# Patient Record
Sex: FEMALE | Race: WHITE
[De-identification: names, ages, dates, MRNs, and addresses within clinical notes are randomized per-mention and may not be internally consistent; named-entity substitution may affect disease eponyms.]

---

## 2019-06-14 ENCOUNTER — HOSPITAL ENCOUNTER (INPATIENT)
Dept: HOSPITAL 74 - YASAS | Age: 34
LOS: 5 days | Discharge: HOME | DRG: 773 | End: 2019-06-19
Attending: SURGERY | Admitting: SURGERY
Payer: COMMERCIAL

## 2019-06-14 VITALS — BODY MASS INDEX: 19.1 KG/M2

## 2019-06-14 DIAGNOSIS — M54.5: ICD-10-CM

## 2019-06-14 DIAGNOSIS — M51.26: ICD-10-CM

## 2019-06-14 DIAGNOSIS — R63.4: ICD-10-CM

## 2019-06-14 DIAGNOSIS — G47.00: ICD-10-CM

## 2019-06-14 DIAGNOSIS — F13.230: ICD-10-CM

## 2019-06-14 DIAGNOSIS — F17.210: ICD-10-CM

## 2019-06-14 DIAGNOSIS — F19.24: ICD-10-CM

## 2019-06-14 DIAGNOSIS — F32.9: ICD-10-CM

## 2019-06-14 DIAGNOSIS — F19.282: ICD-10-CM

## 2019-06-14 DIAGNOSIS — F12.10: ICD-10-CM

## 2019-06-14 DIAGNOSIS — Z85.841: ICD-10-CM

## 2019-06-14 DIAGNOSIS — E86.0: ICD-10-CM

## 2019-06-14 DIAGNOSIS — F41.9: ICD-10-CM

## 2019-06-14 DIAGNOSIS — G89.29: ICD-10-CM

## 2019-06-14 DIAGNOSIS — F14.10: ICD-10-CM

## 2019-06-14 DIAGNOSIS — F11.23: Primary | ICD-10-CM

## 2019-06-14 DIAGNOSIS — Z88.8: ICD-10-CM

## 2019-06-14 PROCEDURE — HZ2ZZZZ DETOXIFICATION SERVICES FOR SUBSTANCE ABUSE TREATMENT: ICD-10-PCS | Performed by: SURGERY

## 2019-06-14 RX ADMIN — NICOTINE SCH MG: 21 PATCH TRANSDERMAL at 11:20

## 2019-06-14 RX ADMIN — METHOCARBAMOL PRN MG: 500 TABLET ORAL at 19:46

## 2019-06-14 RX ADMIN — NICOTINE POLACRILEX PRN MG: 2 GUM, CHEWING ORAL at 20:27

## 2019-06-14 RX ADMIN — Medication PRN MG: at 23:11

## 2019-06-14 RX ADMIN — Medication SCH MG: at 22:13

## 2019-06-14 NOTE — HP
COWS





- Scale


Resting Pulse: 1= NE 


Sweatin= Chills/Flushing


Restless Observation: 1= Difficult to Sit Still


Pupil Size: 1= Pupils >than Normal


Bone or Joint Aches: 2= Severe Diffuse Aches


Runny Nose/ Eye Tearin= Runny Nose/Eyes


GI Upset > 30mins: 2= Nausea/Diarrhea


Tremor Observation: 2= Slight Tremor Visible


Yawning Observation: 2= >3x During Session


Anxiety or Irritability: 2=Irritable/Anxious


Goose Flesh Skin: 0=Smooth Skin


COWS Score: 16





CIWA Score


Nausea/Vomitin


Muscle Tremors: 2


Anxiety: 2


Agitation: 2


Paroxysmal Sweats: 1-Minimal Palms Moist


Orientation: 1-Uncertain about Date


Tacttile Disturbances: 1-Very Mild Itch/Numbness


Auditory Disturbances: 1-Very Mild


Visual Disturbances: 0-None


Headache: 2-Mild


CIWA-Ar Total Score: 14





- Admission Criteria


[facility] Guidelines: Admission for Medically Managed Detox: 


Requires at least one of the followin. CIWA greater than 12


2. Seizures within the past 24 hours


3. Delirium tremens within the past 24 hours


4. Hallucinations within the past 24 hours


5. Acute intervention needed for co  occurring medical disorder


6. Acute intervention needed for co  occurring psychiatric disorder


7. Severe withdrawal that cannot be handled at a lower level of care (continued


    vomiting, continued diarrhea, abnormal vital signs) requiring intravenous


    medication and/or fluids


8. Pregnancy








Admission ROS S





- [facility]


Chief Complaint: 





i need help to stop using heroin,xanax,also cocaine and marijuana


Allergies/Adverse Reactions: 


 Allergies











Allergy/AdvReac Type Severity Reaction Status Date / Time


 


carbamazepine [From Tegretol] Allergy Mild Rash Verified 19 08:55


 


sertraline [From Zoloft] Allergy Mild Rash Verified 19 08:54











History of Present Illness: 





this 33 years old female with heroin and xanax dependence and cocaine and 

marijuana abused,seeking detox,withdrawal symptom,


last detox [facility] in 2015


nicotine dependence 1 pack/day,would like to have nicotine patch and um


weight loss 


anxiety,depression,insomnia


longest sobriety 2 years


possible rehab after detox


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


low back pain herniated disc


Exam Limitations: No Limitations





- Ebola screening


Have you traveled outside of the country in the last 21 days: No (N)


Have you had contact with anyone from an Ebola affected area: No


Do you have a fever: No





- Review of Systems


Constitutional: Chills, Loss of Appetite, Malaise, Night Sweats, Changes in 

sleep, Weakness, Unintentional Wgt. Loss


EENT: reports: Tearing, Nose Congestion, Other (history of benign brain tumor 

at age of 10 years old)


Respiratory: reports: No Symptoms reported


Cardiac: reports: No Symptoms Reported


GI: reports: Diarrhea, Nausea, Poor Appetite, Abdominal cramping


: reports: No Symptoms Reported


Musculoskeletal: reports: Back Pain, Joint Pain, Muscle Pain, Joint Stiffness


Integumentary: reports: Dryness


Neuro: reports: Headache, Tremors


Endocrine: reports: No Symptoms Reported


Hematology: reports: No Symptoms Reported


Psychiatric: reports: No Sypmtoms Reported, Judgement Intact, Mood/Affect 

Appropiate, Orientated x3, Anxious, Depressed, other (insomnia)


Other Systems: Reviewed and Negative





Patient History





- Patient Medical History


Hx Anemia: No


Hx Asthma: No


Hx Chronic Obstructive Pulmonary Disease (COPD): No


Hx Cancer: No


Hx Cardiac Disorders: No


Hx Congestive Heart Failure: No


Hx Hypertension: No


Hx Hypercholesterolemia: No


Hx Pacemaker: No


HX Cerebrovascular Accident: No


Hx Seizures: No


Hx Dementia: No


Hx Diabetes: No


Hx Gastrointestinal Disorders: No


Hx Liver Disease: No


Hx Genitourinary Disorders: No


Hx Sexually Transmitted Disorders: No


Hx Renal Disease (ESRD): No


Hx Thyroid Disease: No


Hx Human Immunodeficiency Virus (HIV): No (last  negative)


Hx Hepatitis C: No


Hx Depression: Yes


Hx Suicide Attempt: No


Hx Bipolar Disorder: No


Hx Schizophrenia: No


Other Medical History: no suicidal,no homicidal,history of benign brain tumor 

at age of 10 years ol





- Patient Surgical History


Past Surgical History: Yes


Hx Neurologic Surgery: Yes (removal of benign brain tumor at age 10 years)


Hx  Section: Yes (x )





- PPD History


Previous Implant?: Yes


Documented Results: Negative w/o proof


Implanted On Prior [facility] Admission?: No


PPD to be Administered?: Yes





- Reproductive History


Patient is a Female of Child Bearing Age (11 -55 yrs old): Yes


Last Menstrual Period: 19


Patient Pregnant: No





- Smoking Cessation


Smoking history: Current every day smoker


Have you smoked in the past 12 months: Yes


Cigars Per Day: 20


Hx Chewing Tobacco Use: No


Initiated information on smoking cessation: Yes


'Breaking Loose' booklet given: 19





- Substance & Tx. History


Hx Alcohol Use: No


Hx Substance Use: Yes


Substance Use Type: Cocaine, Heroin, Marijuana, Tranquilizers


Hx Substance Use Treatment: Yes ([facility] )





- Substances abused


  ** Heroin


Substance route: Inhalation


Frequency: Daily


Amount used: 14 bags a day


Age of first use: 24


Date of last use: 19





  ** Benzodiazepine (Klonopin)


Substance route: Oral


Frequency: Daily


Amount used: 12 pills of 2 mgs


Age of first use: 24


Date of last use: 19





  ** Cocaine


Substance route: Smoking


Frequency: 1-3 times last 30 days


Amount used: 50$


Age of first use: 28


Date of last use: 19





  ** Marijuana/Hashish


Substance route: Smoking


Frequency: 1-3 times last 30 days


Amount used: 20$


Age of first use: 15


Date of last use: 19





Family Disease History





- Family Disease History


Family History: Denies





Admission Physical Exam B[facility]





- Vital Signs


Vital Signs: 


 Vital Signs - 24 hr











  19





  08:57


 


Temperature 99.3 F


 


Pulse Rate 84


 


Respiratory 16





Rate 


 


Blood Pressure 105/74














- Physical


General Appearance: Yes: Moderate Distress, Irritable, Anxious


HEENTM: Yes: Normal ENT Inspection, BILL, Pharynx Normal, Other (history of 

removal of benign brain tumor at age of 10 years old scar in occipital area)


Respiratory: Yes: Within Normal Limits, Lungs Clear, Normal Breath Sounds, No 

Respiratory Distress


Neck: Yes: Supple, Trachea in good position, Thyroid tenderness


Breast: Yes: Breast Exam Deferred


Cardiology: Yes: Within Normal Limits, Regular Rhythm, Regular Rate, S1, S2


Abdominal: Yes: Within Normal Limits, Normal Bowel Sounds, Non Tender, Flat, 

Soft


Genitourinary: Yes: Within Normal Limits


Back: Yes: Normal Inspection, Muscle Spasm


Musculoskeletal: Yes: full range of Motion, Back pain, Muscle Pain


Extremities: Yes: Normal Range of Motion, Tremors


Neurological: Yes: CNs II-XII NML intact, Alert, Motor Strength 5/5


Integumentary: Yes: Dry


Lymphatic: Yes: Within Normal Limits





- Diagnostic


(1) Opioid dependence with withdrawal


Current Visit: Yes   Status: Acute   





(2) Poisoning by unspecified sedative or hypnotic


Current Visit: Yes   Status: Acute   





(3) Uncomplicated sedative, hypnotic or anxiolytic withdrawal


Current Visit: Yes   Status: Acute   





(4) Low back pain


Current Visit: Yes   Status: Acute   





(5) Herniated lumbar intervertebral disc


Current Visit: Yes   Status: Acute   





(6) History of brain tumor


Current Visit: Yes   Status: Acute   





(7) Weight loss


Current Visit: Yes   Status: Acute   





(8) Anxiety and depression


Current Visit: Yes   Status: Acute   





(9) Insomnia


Current Visit: Yes   Status: Acute   





(10) Dehydration


Current Visit: Yes   Status: Acute   





Cleared for Admission S





- Detox or Rehab


[facility] Level of Care: Medically Managed


Detox Regimen/Protocol: Methadone/Valium





Inpatient Rehab Admission





- Rehab Decision to Admit


Inpatient rehab admission?: No

## 2019-06-15 LAB
ALBUMIN SERPL-MCNC: 4 G/DL (ref 3.4–5)
ALP SERPL-CCNC: 40 U/L (ref 45–117)
ALT SERPL-CCNC: 24 U/L (ref 13–61)
ANION GAP SERPL CALC-SCNC: 8 MMOL/L (ref 8–16)
AST SERPL-CCNC: 13 U/L (ref 15–37)
BILIRUB SERPL-MCNC: 0.4 MG/DL (ref 0.2–1)
BUN SERPL-MCNC: 10.3 MG/DL (ref 7–18)
CALCIUM SERPL-MCNC: 8.8 MG/DL (ref 8.5–10.1)
CHLORIDE SERPL-SCNC: 105 MMOL/L (ref 98–107)
CO2 SERPL-SCNC: 24 MMOL/L (ref 21–32)
CREAT SERPL-MCNC: 0.9 MG/DL (ref 0.55–1.3)
DEPRECATED RDW RBC AUTO: 12.3 % (ref 11.6–15.6)
GLUCOSE SERPL-MCNC: 119 MG/DL (ref 74–106)
HCT VFR BLD CALC: 39 % (ref 32.4–45.2)
HGB BLD-MCNC: 13.2 GM/DL (ref 10.7–15.3)
MCH RBC QN AUTO: 32.4 PG (ref 25.7–33.7)
MCHC RBC AUTO-ENTMCNC: 33.9 G/DL (ref 32–36)
MCV RBC: 95.6 FL (ref 80–96)
PLATELET # BLD AUTO: 233 K/MM3 (ref 134–434)
PMV BLD: 9 FL (ref 7.5–11.1)
POTASSIUM SERPLBLD-SCNC: 3.6 MMOL/L (ref 3.5–5.1)
PROT SERPL-MCNC: 7 G/DL (ref 6.4–8.2)
RBC # BLD AUTO: 4.09 M/MM3 (ref 3.6–5.2)
SODIUM SERPL-SCNC: 138 MMOL/L (ref 136–145)
WBC # BLD AUTO: 5.4 K/MM3 (ref 4–10)

## 2019-06-15 RX ADMIN — NICOTINE POLACRILEX PRN MG: 2 GUM, CHEWING ORAL at 14:09

## 2019-06-15 RX ADMIN — Medication PRN MG: at 22:01

## 2019-06-15 RX ADMIN — NICOTINE SCH MG: 21 PATCH TRANSDERMAL at 10:33

## 2019-06-15 RX ADMIN — Medication SCH TAB: at 10:33

## 2019-06-15 RX ADMIN — METHOCARBAMOL PRN MG: 500 TABLET ORAL at 11:49

## 2019-06-15 RX ADMIN — NICOTINE POLACRILEX PRN MG: 2 GUM, CHEWING ORAL at 17:50

## 2019-06-15 RX ADMIN — NICOTINE POLACRILEX PRN MG: 2 GUM, CHEWING ORAL at 22:04

## 2019-06-15 RX ADMIN — Medication SCH MG: at 22:01

## 2019-06-15 RX ADMIN — HYDROXYZINE PAMOATE PRN MG: 25 CAPSULE ORAL at 22:03

## 2019-06-15 NOTE — PN
S CIWA





- CIWA Score


Nausea/Vomitin-No Nausea/No Vomiting


Muscle Tremors: 2


Anxiety: 2


Agitation: 2


Paroxysmal Sweats: 2


Orientation: 0-Oriented


Tacttile Disturbances: 0-None


Auditory Disturbances: 0-None


Visual Disturbances: 0-None


Headache: 2-Mild


CIWA-Ar Total Score: 10





BHS COWS





- Scale


Resting Pulse: 0= [place] 80 or Below


Sweating: 3= Beads of Sweat on Face


Restless Observation: 1= Difficult to Sit Still


Pupil Size: 0= Normal to Room Light


Bone or Joint Aches: 4=Acute Joint/Muscle Pain


Runny Nose/ Eye Tearin= None


GI Upset > 30mins: 0= None


Tremor Observation of Outstretched Hands: 2= Slight Tremor Visible


Yawning Observation: 1= 1-2x During Session


Anxiety or Irritability: 2=Irritable/Anxious


Goose Flesh Skin: 0=Smooth Skin


COWS Score: 13





BHS Progress Note (SOAP)


Subjective: 





c/o headache, irritability/anxiety, sweats, muscle aches, and shakes.


Objective: 





06/15/19 12:47


 Vital Signs











  06/15/19 06/15/19





  06:26 09:42


 


Temperature 97.0 F L 97.5 F L


 


Pulse Rate 74 85


 


Respiratory 18 18





Rate  


 


Blood Pressure 98/56 L 104/66








 Lab Results











WBC  5.4 K/mm3 (4.0-10.0)   06/15/19  07:40    


 


RBC  4.09 M/mm3 (3.60-5.2)   06/15/19  07:40    


 


Hgb  13.2 GM/dL (10.7-15.3)   06/15/19  07:40    


 


Hct  39.0 % (32.4-45.2)   06/15/19  07:40    


 


MCV  95.6 fl (80-96)   06/15/19  07:40    


 


MCHC  33.9 g/dl (32.0-36.0)   06/15/19  07:40    


 


RDW  12.3 % (11.6-15.6)   06/15/19  07:40    


 


Plt Count  233 K/MM3 (134-434)   06/15/19  07:40    


 


Sodium  138 mmol/L (136-145)   06/15/19  07:40    


 


Potassium  3.6 mmol/L (3.5-5.1)   06/15/19  07:40    


 


Chloride  105 mmol/L ()   06/15/19  07:40    


 


Carbon Dioxide  24 mmol/L (21-32)   06/15/19  07:40    


 


Anion Gap  8 MMOL/L (8-16)   06/15/19  07:40    


 


BUN  10.3 mg/dL (7-18)   06/15/19  07:40    


 


Creatinine  0.9 mg/dL (0.55-1.3)   06/15/19  07:40    


 


Random Glucose  119 mg/dL ()  H  06/15/19  07:40    


 


Calcium  8.8 mg/dL (8.5-10.1)   06/15/19  07:40    








Labs noted.


Assessment: 





06/15/19 12:48


AOX3, in no acute distress


Full ROM, ambulating in the unit.


Withdrawal symptoms.


Plan: 





continue detox


increase fluids.

## 2019-06-15 NOTE — EKG
Test Reason : 

Blood Pressure : ***/*** mmHG

Vent. Rate : 074 BPM     Atrial Rate : 074 BPM

   P-R Int : 168 ms          QRS Dur : 088 ms

    QT Int : 380 ms       P-R-T Axes : 075 064 058 degrees

   QTc Int : 421 ms

 

NORMAL SINUS RHYTHM WITH SINUS ARRHYTHMIA

NORMAL ECG

NO PREVIOUS ECGS AVAILABLE

Confirmed by MD GUS, MARCK ([id number]) on 6/15/2019 12:25:48 PM

 

Referred By:             Confirmed By:MARCK WEBER MD

## 2019-06-16 LAB
APPEARANCE UR: CLEAR
BILIRUB UR STRIP.AUTO-MCNC: NEGATIVE MG/DL
COLOR UR: YELLOW
KETONES UR QL STRIP: NEGATIVE
LEUKOCYTE ESTERASE UR QL STRIP.AUTO: NEGATIVE
NITRITE UR QL STRIP: NEGATIVE
PH UR: 5 [PH] (ref 5–8)
PROT UR QL STRIP: NEGATIVE
PROT UR QL STRIP: NEGATIVE
SP GR UR: 1.01 (ref 1.01–1.03)
UROBILINOGEN UR STRIP-MCNC: 0.2 MG/DL (ref 0.2–1)

## 2019-06-16 RX ADMIN — IBUPROFEN PRN MG: 400 TABLET, FILM COATED ORAL at 22:36

## 2019-06-16 RX ADMIN — Medication SCH: at 23:31

## 2019-06-16 RX ADMIN — HYDROXYZINE PAMOATE PRN MG: 25 CAPSULE ORAL at 17:48

## 2019-06-16 RX ADMIN — Medication SCH MG: at 22:37

## 2019-06-16 RX ADMIN — METHOCARBAMOL PRN MG: 500 TABLET ORAL at 12:53

## 2019-06-16 RX ADMIN — NICOTINE POLACRILEX PRN MG: 2 GUM, CHEWING ORAL at 22:39

## 2019-06-16 RX ADMIN — Medication PRN MG: at 22:36

## 2019-06-16 RX ADMIN — METHOCARBAMOL PRN MG: 500 TABLET ORAL at 05:18

## 2019-06-16 RX ADMIN — NICOTINE POLACRILEX PRN MG: 2 GUM, CHEWING ORAL at 11:01

## 2019-06-16 RX ADMIN — Medication SCH TAB: at 10:28

## 2019-06-16 RX ADMIN — METHOCARBAMOL PRN MG: 500 TABLET ORAL at 22:37

## 2019-06-16 RX ADMIN — NICOTINE SCH MG: 21 PATCH TRANSDERMAL at 10:28

## 2019-06-16 RX ADMIN — LIDOCAINE SCH PATCH: 50 PATCH TOPICAL at 15:22
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
## 2019-06-16 NOTE — PN
S CIWA





- CIWA Score


Nausea/Vomitin-Mild Nausea/No Vomiting


Muscle Tremors: 2


Anxiety: 3


Agitation: 1-Slight > Activity


Paroxysmal Sweats: No Perspiration


Orientation: 0-Oriented


Tacttile Disturbances: 0-None


Auditory Disturbances: 0-None


Visual Disturbances: 0-None


Headache: 2-Mild


CIWA-Ar Total Score: 9





BHS COWS





- Scale


Resting Pulse: 0= RI 80 or Below


Sweatin= Chills/Flushing


Restless Observation: 1= Difficult to Sit Still


Pupil Size: 1= Pupils >than Normal


Bone or Joint Aches: 1= Mild Discomfort


Runny Nose/ Eye Tearin= Nasal Congestion


GI Upset > 30mins: 2= Nausea/Diarrhea


Tremor Observation of Outstretched Hands: 0= None


Yawning Observation: 0= None


Anxiety or Irritability: 1=Feels Anxious/Irritable


Goose Flesh Skin: 0=Smooth Skin


COWS Score: 8





BHS Progress Note (SOAP)


Subjective: 


ACHINESS, POOR SLEEP FIDGETY AND ANXIOUS


Objective: 





19 13:55


 Vital Signs - 24 hr











  06/15/19 06/15/19 06/15/19





  14:01 18:07 21:52


 


Temperature 96 F L 96.5 F L 97.1 F L


 


Pulse Rate 65 80 78


 


Respiratory 18 18 16





Rate   


 


Blood Pressure 104/69 109/65 99/66














  19





  00:30 03:30 06:48


 


Temperature   98.1 F


 


Pulse Rate   61


 


Respiratory 18 18 18





Rate   


 


Blood Pressure   99/62














  19





  09:10 13:15


 


Temperature 96.1 F L 97.3 F L


 


Pulse Rate 64 72


 


Respiratory 16 18





Rate  


 


Blood Pressure 92/68 96/66








 Laboratory Tests











  06/15/19 06/15/19 06/15/19





  07:40 07:40 07:40


 


WBC  5.4  


 


RBC  4.09  


 


Hgb  13.2  


 


Hct  39.0  


 


MCV  95.6  


 


MCH  32.4  


 


MCHC  33.9  


 


RDW  12.3  


 


Plt Count  233  


 


MPV  9.0  


 


Sodium   138 


 


Potassium   3.6 


 


Chloride   105 


 


Carbon Dioxide   24 


 


Anion Gap   8 


 


BUN   10.3 


 


Creatinine   0.9 


 


Est GFR (CKD-EPI)AfAm   98.06 


 


Est GFR (CKD-EPI)NonAf   84.60 


 


Random Glucose   119 H 


 


Calcium   8.8 


 


Total Bilirubin   0.4 


 


AST   13 L 


 


ALT   24 


 


Alkaline Phosphatase   40 L 


 


Total Protein   7.0 


 


Albumin   4.0 


 


RPR Titer    Nonreactive








AMBULATORY, ORIENTED, ALERT


Assessment: 





19 13:56


OPIATE DEP


Plan: 


ADD LIDODERM FOR BACK PAIN


CONT CURRENT PROTOCOL

## 2019-06-17 RX ADMIN — NICOTINE POLACRILEX PRN MG: 2 GUM, CHEWING ORAL at 15:00

## 2019-06-17 RX ADMIN — SUVOREXANT PRN MG: 10 TABLET, FILM COATED ORAL at 22:19

## 2019-06-17 RX ADMIN — METHOCARBAMOL PRN MG: 500 TABLET ORAL at 15:00

## 2019-06-17 RX ADMIN — NICOTINE SCH MG: 21 PATCH TRANSDERMAL at 10:21

## 2019-06-17 RX ADMIN — Medication SCH TAB: at 10:20

## 2019-06-17 RX ADMIN — METHOCARBAMOL PRN MG: 500 TABLET ORAL at 22:19

## 2019-06-17 RX ADMIN — NICOTINE POLACRILEX PRN MG: 2 GUM, CHEWING ORAL at 11:03

## 2019-06-17 RX ADMIN — Medication SCH: at 22:16

## 2019-06-17 RX ADMIN — Medication SCH APPLIC: at 22:15

## 2019-06-17 RX ADMIN — Medication SCH MG: at 22:15

## 2019-06-17 RX ADMIN — HYDROXYZINE PAMOATE PRN MG: 25 CAPSULE ORAL at 15:00

## 2019-06-17 RX ADMIN — Medication SCH APPLIC: at 15:01

## 2019-06-17 RX ADMIN — HYDROXYZINE PAMOATE PRN MG: 25 CAPSULE ORAL at 00:36

## 2019-06-17 RX ADMIN — NICOTINE POLACRILEX PRN MG: 2 GUM, CHEWING ORAL at 22:17

## 2019-06-17 RX ADMIN — LIDOCAINE SCH PATCH: 50 PATCH TOPICAL at 10:21

## 2019-06-18 RX ADMIN — Medication SCH: at 22:04

## 2019-06-18 RX ADMIN — Medication SCH TAB: at 10:30

## 2019-06-18 RX ADMIN — HYDROXYZINE PAMOATE PRN MG: 25 CAPSULE ORAL at 14:36

## 2019-06-18 RX ADMIN — IBUPROFEN PRN MG: 400 TABLET, FILM COATED ORAL at 22:07

## 2019-06-18 RX ADMIN — Medication SCH MG: at 22:04

## 2019-06-18 RX ADMIN — METHOCARBAMOL PRN MG: 500 TABLET ORAL at 23:29

## 2019-06-18 RX ADMIN — METHOCARBAMOL PRN MG: 500 TABLET ORAL at 17:01

## 2019-06-18 RX ADMIN — METHOCARBAMOL PRN MG: 500 TABLET ORAL at 10:30

## 2019-06-18 RX ADMIN — SUVOREXANT PRN MG: 10 TABLET, FILM COATED ORAL at 22:07

## 2019-06-18 RX ADMIN — LIDOCAINE SCH PATCH: 50 PATCH TOPICAL at 10:30

## 2019-06-18 RX ADMIN — NICOTINE POLACRILEX PRN MG: 2 GUM, CHEWING ORAL at 14:37

## 2019-06-18 RX ADMIN — Medication SCH APPLIC: at 10:30

## 2019-06-18 RX ADMIN — NICOTINE SCH MG: 21 PATCH TRANSDERMAL at 10:30

## 2019-06-18 RX ADMIN — NICOTINE POLACRILEX PRN MG: 2 GUM, CHEWING ORAL at 10:32

## 2019-06-18 RX ADMIN — NICOTINE POLACRILEX PRN MG: 2 GUM, CHEWING ORAL at 17:02

## 2019-06-18 RX ADMIN — HYDROXYZINE PAMOATE PRN MG: 25 CAPSULE ORAL at 23:30

## 2019-06-18 NOTE — PN
S CIWA





- CIWA Score


Nausea/Vomiting: 3


Muscle Tremors: None


Anxiety: 4-Mod. Anxious/Guarded


Agitation: 2


Paroxysmal Sweats: No Perspiration


Orientation: 0-Oriented


Tacttile Disturbances: 2-Mild Itch/Numbness/Burn


Auditory Disturbances: 0-None


Visual Disturbances: 1-Very Mild Sensitivity


Headache: 0-None Present


CIWA-Ar Total Score: 12





BHS COWS





- Scale


Resting Pulse: 1= [place] 


Sweatin= Chills/Flushing


Restless Observation: 1= Difficult to Sit Still


Pupil Size: 0= Normal to Room Light


Bone or Joint Aches: 2= Severe Diffuse Aches


Runny Nose/ Eye Tearin= None


GI Upset > 30mins: 2= Nausea/Diarrhea


Tremor Observation of Outstretched Hands: 0= None


Yawning Observation: 1= 1-2x During Session


Anxiety or Irritability: 2=Irritable/Anxious


Goose Flesh Skin: 0=Smooth Skin


COWS Score: 10





BHS Progress Note (SOAP)


Subjective: 





Body Aches, Nausea, Fatigue, Anxious.


Objective: 


PATIENT A & O X 3, OBSERVED AMBULATING ON UNIT UNASSISTED. IN NO ACUTE DISTRESS.





19 14:24


 Vital Signs











Temperature  96.0 F L  19 13:29


 


Pulse Rate  88   19 13:29


 


Respiratory Rate  18   19 13:29


 


Blood Pressure  109/75   19 13:29


 


O2 Sat by Pulse Oximetry (%)      








 Laboratory Tests











  06/14/19 06/15/19 06/15/19





  09:45 07:40 07:40


 


WBC   5.4 


 


RBC   4.09 


 


Hgb   13.2 


 


Hct   39.0 


 


MCV   95.6 


 


MCH   32.4 


 


MCHC   33.9 


 


RDW   12.3 


 


Plt Count   233 


 


MPV   9.0 


 


Sodium    138


 


Potassium    3.6


 


Chloride    105


 


Carbon Dioxide    24


 


Anion Gap    8


 


BUN    10.3


 


Creatinine    0.9


 


Est GFR (CKD-EPI)AfAm    98.06


 


Est GFR (CKD-EPI)NonAf    84.60


 


Random Glucose    119 H


 


Calcium    8.8


 


Total Bilirubin    0.4


 


AST    13 L


 


ALT    24


 


Alkaline Phosphatase    40 L


 


Total Protein    7.0


 


Albumin    4.0


 


Urine Color   


 


Urine Appearance   


 


Urine pH   


 


Ur Specific Gravity   


 


Urine Protein   


 


Urine Glucose (UA)   


 


Urine Ketones   


 


Urine Blood   


 


Urine Nitrite   


 


Urine Bilirubin   


 


Urine Urobilinogen   


 


Ur Leukocyte Esterase   


 


POC Urine HCG, Qual  Negative  


 


RPR Titer   














  06/15/19 06/16/19





  07:40 16:00


 


WBC  


 


RBC  


 


Hgb  


 


Hct  


 


MCV  


 


MCH  


 


MCHC  


 


RDW  


 


Plt Count  


 


MPV  


 


Sodium  


 


Potassium  


 


Chloride  


 


Carbon Dioxide  


 


Anion Gap  


 


BUN  


 


Creatinine  


 


Est GFR (CKD-EPI)AfAm  


 


Est GFR (CKD-EPI)NonAf  


 


Random Glucose  


 


Calcium  


 


Total Bilirubin  


 


AST  


 


ALT  


 


Alkaline Phosphatase  


 


Total Protein  


 


Albumin  


 


Urine Color   Yellow


 


Urine Appearance   Clear


 


Urine pH   5.0


 


Ur Specific Gravity   1.007 L


 


Urine Protein   Negative


 


Urine Glucose (UA)   Negative


 


Urine Ketones   Negative


 


Urine Blood   Negative


 


Urine Nitrite   Negative


 


Urine Bilirubin   Negative


 


Urine Urobilinogen   0.2


 


Ur Leukocyte Esterase   Negative


 


POC Urine HCG, Qual  


 


RPR Titer  Nonreactive 








LABS NOTED.


Assessment: 





19 14:24


WITHDRAWAL SYMPTOMS.


Plan: 





CONTINUE DETOX.


PRN COMPAZINE PO FOR NAUSEA.


PRN ROBAXIN PO FOR SEVERE BODY ACHES / MUSCLE SPASMS.


LIDODERM PATCH FOR LOWER BACK PAIN.





PATIENT SCHEDULED FOR DISCHARGE FROM DETOX UNIT TOMORROW.

## 2019-06-19 VITALS — HEART RATE: 83 BPM | TEMPERATURE: 97.6 F | SYSTOLIC BLOOD PRESSURE: 100 MMHG | DIASTOLIC BLOOD PRESSURE: 76 MMHG

## 2019-06-19 RX ADMIN — METHOCARBAMOL PRN MG: 500 TABLET ORAL at 05:47

## 2019-06-19 NOTE — DS
[facility] Detox Discharge Summary


Admission Date: 


06/14/19





Discharge Date: 06/19/19





- History


Present History: Opioid Dependence, Sedative Dependence


Additional Comments: 





PATIENT GOING TO [facility] ([place], [place]) FOR 

AFTERCARE. PATIENT WAS DISCHARGED FORM DETOX UNIT IN STABLE MEDICAL CONDITION.


Pertinent Past History: 





Lower Back Pain, History Of Herniated Intervertebral Lumbar Disc, History Of 

Brain Tumor (Benign, During Childhood), History Of Weight Loss, Nicotine 

Dependence, Anxiety, Depression, Insomnia, Dehydration.





- Physical Exam Results


Vital Signs: 


 Vital Signs











Temperature  97.6 F   06/19/19 09:12


 


Pulse Rate  83   06/19/19 09:12


 


Respiratory Rate  18   06/19/19 09:12


 


Blood Pressure  100/76   06/19/19 09:12


 


O2 Sat by Pulse Oximetry (%)      











Pertinent Admission Physical Exam Findings: 





WITHDRAWAL SYMPTOMS.





 Laboratory Tests











  06/14/19 06/15/19 06/15/19





  09:45 07:40 07:40


 


WBC   5.4 


 


RBC   4.09 


 


Hgb   13.2 


 


Hct   39.0 


 


MCV   95.6 


 


MCH   32.4 


 


MCHC   33.9 


 


RDW   12.3 


 


Plt Count   233 


 


MPV   9.0 


 


Sodium    138


 


Potassium    3.6


 


Chloride    105


 


Carbon Dioxide    24


 


Anion Gap    8


 


BUN    10.3


 


Creatinine    0.9


 


Est GFR (CKD-EPI)AfAm    98.06


 


Est GFR (CKD-EPI)NonAf    84.60


 


Random Glucose    119 H


 


Calcium    8.8


 


Total Bilirubin    0.4


 


AST    13 L


 


ALT    24


 


Alkaline Phosphatase    40 L


 


Total Protein    7.0


 


Albumin    4.0


 


Urine Color   


 


Urine Appearance   


 


Urine pH   


 


Ur Specific Gravity   


 


Urine Protein   


 


Urine Glucose (UA)   


 


Urine Ketones   


 


Urine Blood   


 


Urine Nitrite   


 


Urine Bilirubin   


 


Urine Urobilinogen   


 


Ur Leukocyte Esterase   


 


POC Urine HCG, Qual  Negative  


 


RPR Titer   














  06/15/19 06/16/19





  07:40 16:00


 


WBC  


 


RBC  


 


Hgb  


 


Hct  


 


MCV  


 


MCH  


 


MCHC  


 


RDW  


 


Plt Count  


 


MPV  


 


Sodium  


 


Potassium  


 


Chloride  


 


Carbon Dioxide  


 


Anion Gap  


 


BUN  


 


Creatinine  


 


Est GFR (CKD-EPI)AfAm  


 


Est GFR (CKD-EPI)NonAf  


 


Random Glucose  


 


Calcium  


 


Total Bilirubin  


 


AST  


 


ALT  


 


Alkaline Phosphatase  


 


Total Protein  


 


Albumin  


 


Urine Color   Yellow


 


Urine Appearance   Clear


 


Urine pH   5.0


 


Ur Specific Gravity   1.007 L


 


Urine Protein   Negative


 


Urine Glucose (UA)   Negative


 


Urine Ketones   Negative


 


Urine Blood   Negative


 


Urine Nitrite   Negative


 


Urine Bilirubin   Negative


 


Urine Urobilinogen   0.2


 


Ur Leukocyte Esterase   Negative


 


POC Urine HCG, Qual  


 


RPR Titer  Nonreactive 








LABS NOTED.





- Treatment


Hospital Course: Detox Protocol Followed, Detoxed Safely, Responded well, 

Discharged Condition Good, Rehab Referral Accepted


Patient has Accepted a Rehab Referral to: [facility] (

[place], [place]).





- Medication


Discharge Medications: 


Ambulatory Orders





NK [No Known Home Medication]  06/14/19 











- Diagnosis


(1) Anxiety and depression


Status: Acute   





(2) Dehydration


Status: Acute   





(3) Insomnia


Status: Acute   


Qualifiers: 


   Insomnia type: unspecified   Qualified Code(s): G47.00 - Insomnia, 

unspecified   





(4) Opioid dependence with withdrawal


Status: Acute   





(5) Poisoning by unspecified sedative or hypnotic


Status: Acute   





(6) Uncomplicated sedative, hypnotic or anxiolytic withdrawal


Status: Acute   





(7) Weight loss


Status: Acute   





(8) Low back pain


Status: Chronic   


Qualifiers: 


   Chronicity: unspecified   Back pain laterality: unspecified   Sciatica 

presence: unspecified whether sciatica present   Qualified Code(s): M54.5 - Low 

back pain   





(9) Herniated lumbar intervertebral disc


Status: Resolved   





(10) History of brain tumor


Status: Resolved   





(11) Cannabis abuse


Status: Acute   





(12) Cocaine abuse


Status: Acute   





(13) Substance induced mood disorder


Status: Acute   





(14) Substance-induced sleep disorder


Status: Acute   





(15) Nicotine dependence


Status: Chronic   


Qualifiers: 


   Nicotine product type: cigarettes   Substance use status: uncomplicated   

Qualified Code(s): F17.210 - Nicotine dependence, cigarettes, uncomplicated   





- AMA


Did Patient Leave Against Medical Advice: No

## 2024-09-24 NOTE — PN
S CIWA





- CIWA Score


Nausea/Vomitin-No Nausea/No Vomiting


Muscle Tremors: None


Anxiety: 4-Mod. Anxious/Guarded


Agitation: 3


Paroxysmal Sweats: No Perspiration


Orientation: 0-Oriented


Tacttile Disturbances: 2-Mild Itch/Numbness/Burn


Auditory Disturbances: 0-None


Visual Disturbances: 2-Mild Sensitivity


Headache: 0-None Present


CIWA-Ar Total Score: 11





[facility] COWS





- Scale


Resting Pulse: 0= [place] 80 or Below


Sweatin= No chills or Flushing


Restless Observation: 1= Difficult to Sit Still


Pupil Size: 0= Normal to Room Light


Bone or Joint Aches: 2= Severe Diffuse Aches


Runny Nose/ Eye Tearin= None


GI Upset > 30mins: 0= None


Tremor Observation of Outstretched Hands: 0= None


Yawning Observation: 1= 1-2x During Session


Anxiety or Irritability: 2=Irritable/Anxious


Goose Flesh Skin: 3=Piloerection


COWS Score: 9





[facility] Progress Note (SOAP)


Subjective: 





Body Aches, Anxious, Interrupted Sleep.


Objective: 


PATIENT A & O X 3, OBSERVED AMBULATING ON UNIT UNASSISTED. IN NO ACUTE DISTRESS.





19 16:26


 Vital Signs











Temperature  96.4 F L  19 13:26


 


Pulse Rate  70   19 13:26


 


Respiratory Rate  16   19 13:26


 


Blood Pressure  101/64   19 13:26


 


O2 Sat by Pulse Oximetry (%)      








 Laboratory Tests











  06/14/19 06/15/19 06/15/19





  09:45 07:40 07:40


 


WBC   5.4 


 


RBC   4.09 


 


Hgb   13.2 


 


Hct   39.0 


 


MCV   95.6 


 


MCH   32.4 


 


MCHC   33.9 


 


RDW   12.3 


 


Plt Count   233 


 


MPV   9.0 


 


Sodium    138


 


Potassium    3.6


 


Chloride    105


 


Carbon Dioxide    24


 


Anion Gap    8


 


BUN    10.3


 


Creatinine    0.9


 


Est GFR (CKD-EPI)AfAm    98.06


 


Est GFR (CKD-EPI)NonAf    84.60


 


Random Glucose    119 H


 


Calcium    8.8


 


Total Bilirubin    0.4


 


AST    13 L


 


ALT    24


 


Alkaline Phosphatase    40 L


 


Total Protein    7.0


 


Albumin    4.0


 


Urine Color   


 


Urine Appearance   


 


Urine pH   


 


Ur Specific Gravity   


 


Urine Protein   


 


Urine Glucose (UA)   


 


Urine Ketones   


 


Urine Blood   


 


Urine Nitrite   


 


Urine Bilirubin   


 


Urine Urobilinogen   


 


Ur Leukocyte Esterase   


 


POC Urine HCG, Qual  Negative  


 


RPR Titer   














  06/15/19 06/16/19





  07:40 16:00


 


WBC  


 


RBC  


 


Hgb  


 


Hct  


 


MCV  


 


MCH  


 


MCHC  


 


RDW  


 


Plt Count  


 


MPV  


 


Sodium  


 


Potassium  


 


Chloride  


 


Carbon Dioxide  


 


Anion Gap  


 


BUN  


 


Creatinine  


 


Est GFR (CKD-EPI)AfAm  


 


Est GFR (CKD-EPI)NonAf  


 


Random Glucose  


 


Calcium  


 


Total Bilirubin  


 


AST  


 


ALT  


 


Alkaline Phosphatase  


 


Total Protein  


 


Albumin  


 


Urine Color   Yellow


 


Urine Appearance   Clear


 


Urine pH   5.0


 


Ur Specific Gravity   1.007 L


 


Urine Protein   Negative


 


Urine Glucose (UA)   Negative


 


Urine Ketones   Negative


 


Urine Blood   Negative


 


Urine Nitrite   Negative


 


Urine Bilirubin   Negative


 


Urine Urobilinogen   0.2


 


Ur Leukocyte Esterase   Negative


 


POC Urine HCG, Qual  


 


RPR Titer  Nonreactive 








LABS NOTED.


Assessment: 





19 16:26


WITHDRAWAL SYMPTOMS.





Plan: 





CONTINUE DETOX. complains of pain/discomfort